# Patient Record
Sex: FEMALE | Race: WHITE
[De-identification: names, ages, dates, MRNs, and addresses within clinical notes are randomized per-mention and may not be internally consistent; named-entity substitution may affect disease eponyms.]

---

## 2020-08-11 ENCOUNTER — HOSPITAL ENCOUNTER (INPATIENT)
Dept: HOSPITAL 35 - ER | Age: 65
LOS: 2 days | Discharge: HOME | DRG: 246 | End: 2020-08-13
Attending: HOSPITALIST | Admitting: HOSPITALIST
Payer: COMMERCIAL

## 2020-08-11 VITALS — SYSTOLIC BLOOD PRESSURE: 154 MMHG | DIASTOLIC BLOOD PRESSURE: 95 MMHG

## 2020-08-11 VITALS — SYSTOLIC BLOOD PRESSURE: 105 MMHG | DIASTOLIC BLOOD PRESSURE: 58 MMHG

## 2020-08-11 VITALS — DIASTOLIC BLOOD PRESSURE: 66 MMHG | SYSTOLIC BLOOD PRESSURE: 112 MMHG

## 2020-08-11 VITALS — HEIGHT: 60 IN | BODY MASS INDEX: 30.23 KG/M2 | WEIGHT: 154 LBS

## 2020-08-11 VITALS — SYSTOLIC BLOOD PRESSURE: 141 MMHG | DIASTOLIC BLOOD PRESSURE: 81 MMHG

## 2020-08-11 VITALS — SYSTOLIC BLOOD PRESSURE: 113 MMHG | DIASTOLIC BLOOD PRESSURE: 66 MMHG

## 2020-08-11 VITALS — SYSTOLIC BLOOD PRESSURE: 145 MMHG | DIASTOLIC BLOOD PRESSURE: 83 MMHG

## 2020-08-11 VITALS — SYSTOLIC BLOOD PRESSURE: 109 MMHG | DIASTOLIC BLOOD PRESSURE: 76 MMHG

## 2020-08-11 VITALS — DIASTOLIC BLOOD PRESSURE: 62 MMHG | SYSTOLIC BLOOD PRESSURE: 105 MMHG

## 2020-08-11 VITALS — SYSTOLIC BLOOD PRESSURE: 127 MMHG | DIASTOLIC BLOOD PRESSURE: 70 MMHG

## 2020-08-11 VITALS — DIASTOLIC BLOOD PRESSURE: 74 MMHG | SYSTOLIC BLOOD PRESSURE: 127 MMHG

## 2020-08-11 VITALS — DIASTOLIC BLOOD PRESSURE: 76 MMHG | SYSTOLIC BLOOD PRESSURE: 109 MMHG

## 2020-08-11 VITALS — SYSTOLIC BLOOD PRESSURE: 116 MMHG | DIASTOLIC BLOOD PRESSURE: 95 MMHG

## 2020-08-11 VITALS — DIASTOLIC BLOOD PRESSURE: 69 MMHG | SYSTOLIC BLOOD PRESSURE: 119 MMHG

## 2020-08-11 DIAGNOSIS — K92.0: ICD-10-CM

## 2020-08-11 DIAGNOSIS — I21.3: Primary | ICD-10-CM

## 2020-08-11 DIAGNOSIS — Z71.6: ICD-10-CM

## 2020-08-11 DIAGNOSIS — F41.9: ICD-10-CM

## 2020-08-11 DIAGNOSIS — E53.8: ICD-10-CM

## 2020-08-11 DIAGNOSIS — E78.5: ICD-10-CM

## 2020-08-11 DIAGNOSIS — I25.119: ICD-10-CM

## 2020-08-11 DIAGNOSIS — Z91.19: ICD-10-CM

## 2020-08-11 DIAGNOSIS — Z79.899: ICD-10-CM

## 2020-08-11 DIAGNOSIS — F17.210: ICD-10-CM

## 2020-08-11 DIAGNOSIS — Z82.49: ICD-10-CM

## 2020-08-11 DIAGNOSIS — N17.0: ICD-10-CM

## 2020-08-11 DIAGNOSIS — Z95.2: ICD-10-CM

## 2020-08-11 DIAGNOSIS — I10: ICD-10-CM

## 2020-08-11 LAB
ANION GAP SERPL CALC-SCNC: 14 MMOL/L (ref 7–16)
BASOPHILS NFR BLD AUTO: 1.1 % (ref 0–2)
BUN SERPL-MCNC: 13 MG/DL (ref 7–18)
CALCIUM SERPL-MCNC: 9.1 MG/DL (ref 8.5–10.1)
CHLORIDE SERPL-SCNC: 101 MMOL/L (ref 98–107)
CHOLEST SERPL-MCNC: 252 MG/DL (ref ?–200)
CO2 SERPL-SCNC: 20 MMOL/L (ref 21–32)
CREAT SERPL-MCNC: 1.3 MG/DL (ref 0.6–1)
EOSINOPHIL NFR BLD: 0.3 % (ref 0–3)
ERYTHROCYTE [DISTWIDTH] IN BLOOD BY AUTOMATED COUNT: 13.5 % (ref 10.5–14.5)
ERYTHROCYTE [DISTWIDTH] IN BLOOD BY AUTOMATED COUNT: 13.5 % (ref 10.5–14.5)
FOLATE SERPL-MCNC: 13.1 NG/ML (ref 8.6–58.9)
GLUCOSE SERPL-MCNC: 113 MG/DL (ref 74–106)
GRANULOCYTES NFR BLD MANUAL: 72.6 % (ref 36–66)
HCT VFR BLD CALC: 41.8 % (ref 37–47)
HCT VFR BLD CALC: 43.1 % (ref 37–47)
HDLC SERPL-MCNC: 73 MG/DL (ref 40–?)
HGB BLD-MCNC: 13.9 GM/DL (ref 12–15)
HGB BLD-MCNC: 14.6 GM/DL (ref 12–15)
INR PPP: 1
LDLC SERPL-MCNC: 148 MG/DL (ref ?–100)
LYMPHOCYTES NFR BLD AUTO: 18.2 % (ref 24–44)
MCH RBC QN AUTO: 31 PG (ref 26–34)
MCH RBC QN AUTO: 31.1 PG (ref 26–34)
MCHC RBC AUTO-ENTMCNC: 33.2 G/DL (ref 28–37)
MCHC RBC AUTO-ENTMCNC: 33.9 G/DL (ref 28–37)
MCV RBC: 91.8 FL (ref 80–100)
MCV RBC: 93.2 FL (ref 80–100)
MONOCYTES NFR BLD: 7.8 % (ref 1–8)
NEUTROPHILS # BLD: 8.3 THOU/UL (ref 1.4–8.2)
PLATELET # BLD: 248 THOU/UL (ref 150–400)
PLATELET # BLD: 276 THOU/UL (ref 150–400)
POTASSIUM SERPL-SCNC: 4.1 MMOL/L (ref 3.5–5.1)
PROTHROMBIN TIME: 9.7 SECONDS (ref 9.3–11.4)
RBC # BLD AUTO: 4.48 MIL/UL (ref 4.2–5)
RBC # BLD AUTO: 4.7 MIL/UL (ref 4.2–5)
SODIUM SERPL-SCNC: 135 MMOL/L (ref 136–145)
TC:HDL: 3.5 RATIO
TRIGL SERPL-MCNC: 155 MG/DL (ref ?–150)
TROPONIN I SERPL-MCNC: 13.96 NG/ML (ref ?–0.06)
TSH SERPL-ACNC: 1.36 UIU/ML (ref 0.36–3.74)
VIT B12 SERPL-MCNC: 168 PG/ML (ref 193–986)
VLDLC SERPL CALC-MCNC: 31 MG/DL (ref ?–40)
WBC # BLD AUTO: 11.4 THOU/UL (ref 4–11)
WBC # BLD AUTO: 11.6 THOU/UL (ref 4–11)

## 2020-08-11 PROCEDURE — 27000 TENOTOMY ADDUCTOR HIP PERQ: CPT

## 2020-08-11 PROCEDURE — 10078: CPT

## 2020-08-11 PROCEDURE — 10081 I&D PILONIDAL CYST COMP: CPT

## 2020-08-11 NOTE — EKG
[facility]
[place]   [address]                     ELECTROCARDIOGRAM REPORT      
_______________________________________________________________________________
 
Name:       MIRELLA HUTCHINS                   Room #:         249-P       ADM IN  
M.R.#:      [id number]                       Account #:      [id number]  
Admission:  20    Attend Phys:    Adelso Andujar MD 
Discharge:              Date of Birth:  [birth year]  
                                                          Report #: [id number]
                                                                    [id number]
_______________________________________________________________________________
THIS REPORT FOR:  
 
cc:  Joes Johnston David J. DO Couchonnal, Luis F. MD                                            ~
THIS REPORT FOR:   //name//                          
 
                         [facility] ED
                                       
Test Date:    2020               Test Time:    11:40:01
Pat Name:     MIRELLA HUTCHINS              Department:   
Patient ID:   [id number]            Room:         249
Gender:       F                        Technician:   CARY
:          1955               Requested By: Jamir Cazares
Order Number: [id number] MD:   Shubham Garcia
                                 Measurements
Intervals                              Axis          
Rate:         80                       P:            -7
ND:           132                      QRS:          54
QRSD:         86                       T:            71
QT:           349                                    
QTc:          403                                    
                           Interpretive Statements
Sinus rhythm
Lateral infarct, acute (LAD)
Anteroseptal infarct, age indeterminate
No previous ECG available for comparison
Electronically Signed On 2020 15:41:36 CDT by Shubham Garcia
[email or web address]
 
 
 
 
 
 
 
 
 
 
 
 
 
 
 
  <ELECTRONICALLY SIGNED>
   By: Shubham Garcia MD        
  20     1541
D: 20 1140                           _____________________________________
T: 20 1140                           Shubham Garcia MD          /EPI

## 2020-08-11 NOTE — NUR
TPA TO BE GIVEN PER INTERVENTIONAL CARDIOLOGIST. RISKS AND BENEFITS DISCUSSED
WITH PATIENT BY DOCTOR. PATIENT DENIES ANY QUESTIONS.

## 2020-08-11 NOTE — EKG
[facility]
[place]   [address]                     ELECTROCARDIOGRAM REPORT      
_______________________________________________________________________________
 
Name:       MIRELLA HUTCHINS                   Room #:         249-P       ADM IN  
M.R.#:      [id number]                       Account #:      [id number]  
Admission:  20    Attend Phys:    Adelso Andujar MD 
Discharge:              Date of Birth:  [birth year]  
                                                          Report #: [id number]
                                                                    [id number]
_______________________________________________________________________________
THIS REPORT FOR:  
 
cc:  Jose Johnston David J. DO Couchonnal, Luis F. MD                                            ~
THIS REPORT FOR:   //name//                          
 
                         [facility] ED
                                       
Test Date:    2020               Test Time:    14:39:40
Pat Name:     MIRELLA HUTCHINS              Department:   
Patient ID:   [id number]            Room:         249
Gender:       F                        Technician:   ANTWON SAINZ
:          1955               Requested By: Jamir Cazares
Order Number: [id number] MD:   Shubham Garcia
                                 Measurements
Intervals                              Axis          
Rate:         73                       P:            8
UT:           127                      QRS:          77
QRSD:         102                      T:            62
QT:           397                                    
QTc:          438                                    
                           Interpretive Statements
Sinus rhythm
Lateral infarct, acute (LAD)
Anteroseptal infarct, age indeterminate
Compared to ECG 2020 11:40:01
No significant changes
Electronically Signed On 2020 15:42:16 CDT by Shubham Garcia
[email or web address]
 
 
 
 
 
 
 
 
 
 
 
 
 
 
  <ELECTRONICALLY SIGNED>
   By: Shubham Garcia MD        
  20     1542
D: 20 1439                           _____________________________________
T: 20 1439                           Shubham Garcia MD          /EPI

## 2020-08-11 NOTE — NUR
VAT CONSULTED FOR A PICC FOR MULITPLE MEDS IN ICU.  5FRTLPICC PLACED
RUABRACHIAL WITH PT HAVING EXTREME ANXIETY.  TIP AT THE [facility].  PLEASE SEE NI
FOR DETAILS

## 2020-08-11 NOTE — NUR
PT ARRIVED AT 1515 VIA STRETCHER FROM ER. PT WAS ABLE TO MOVE SELF FROM
STRETCHER TO BED. PT HOOKED UP TO CARDIAC MONITOR WITH BLOOD PRESSURE
MONITORING AND SPO2 MONITORING. PT STARTED ON IV NS PER ORDER. SEE MAR.
MEDICATIONS REQUESTED FROM PHARMACY AS ORDERED. PT BEGAN HAVING CHEST PAIN,
NITRO SUBLINGUAL GIVEN X2. PT STARTED ON 2L NC FOR SPO2 OF 88%. PT'S SON
ARRIVED AND IS IN ROOM WITH PT. PT STATES SHE WILL DECIDE IN AM IF SHE WANTS
TO HAVE CATH OR NOT. GI ROUNDED ON PT AND NEW ORDERS WRITTEN. CARDIOLOGY NP
CALLED ABOUT CHEST PAIN, NEW ORDERS WILL BE PLACED IN COMPUTER FOR NEW
MEDICATIONS PER CARDIOLOGY. PT'S SON GIVEN PRIVACY CODE AND VISITING HOURS
INFORMATION. RN VERIFIED WITH SON THAT WE HAVE CORRECT PHONE NUMBER AS HE IS
PT'S ONLY CONTACT. AWAITING NEW ORDERS AND WILL IMPLEMENT AS SOON AS THEY ARE
IN COMPUTER AND VERIFIED/AVAILABLE BY PHARMACY.

## 2020-08-12 VITALS — SYSTOLIC BLOOD PRESSURE: 129 MMHG | DIASTOLIC BLOOD PRESSURE: 78 MMHG

## 2020-08-12 VITALS — SYSTOLIC BLOOD PRESSURE: 139 MMHG | DIASTOLIC BLOOD PRESSURE: 78 MMHG

## 2020-08-12 VITALS — DIASTOLIC BLOOD PRESSURE: 83 MMHG | SYSTOLIC BLOOD PRESSURE: 132 MMHG

## 2020-08-12 VITALS — SYSTOLIC BLOOD PRESSURE: 135 MMHG | DIASTOLIC BLOOD PRESSURE: 89 MMHG

## 2020-08-12 VITALS — SYSTOLIC BLOOD PRESSURE: 146 MMHG | DIASTOLIC BLOOD PRESSURE: 97 MMHG

## 2020-08-12 VITALS — DIASTOLIC BLOOD PRESSURE: 97 MMHG | SYSTOLIC BLOOD PRESSURE: 146 MMHG

## 2020-08-12 VITALS — DIASTOLIC BLOOD PRESSURE: 77 MMHG | SYSTOLIC BLOOD PRESSURE: 135 MMHG

## 2020-08-12 VITALS — DIASTOLIC BLOOD PRESSURE: 74 MMHG | SYSTOLIC BLOOD PRESSURE: 133 MMHG

## 2020-08-12 VITALS — SYSTOLIC BLOOD PRESSURE: 132 MMHG | DIASTOLIC BLOOD PRESSURE: 78 MMHG

## 2020-08-12 VITALS — SYSTOLIC BLOOD PRESSURE: 118 MMHG | DIASTOLIC BLOOD PRESSURE: 60 MMHG

## 2020-08-12 VITALS — SYSTOLIC BLOOD PRESSURE: 121 MMHG | DIASTOLIC BLOOD PRESSURE: 64 MMHG

## 2020-08-12 VITALS — DIASTOLIC BLOOD PRESSURE: 77 MMHG | SYSTOLIC BLOOD PRESSURE: 126 MMHG

## 2020-08-12 VITALS — SYSTOLIC BLOOD PRESSURE: 134 MMHG | DIASTOLIC BLOOD PRESSURE: 80 MMHG

## 2020-08-12 VITALS — DIASTOLIC BLOOD PRESSURE: 89 MMHG | SYSTOLIC BLOOD PRESSURE: 131 MMHG

## 2020-08-12 VITALS — SYSTOLIC BLOOD PRESSURE: 126 MMHG | DIASTOLIC BLOOD PRESSURE: 77 MMHG

## 2020-08-12 VITALS — DIASTOLIC BLOOD PRESSURE: 82 MMHG | SYSTOLIC BLOOD PRESSURE: 138 MMHG

## 2020-08-12 VITALS — SYSTOLIC BLOOD PRESSURE: 120 MMHG | DIASTOLIC BLOOD PRESSURE: 67 MMHG

## 2020-08-12 VITALS — DIASTOLIC BLOOD PRESSURE: 73 MMHG | SYSTOLIC BLOOD PRESSURE: 129 MMHG

## 2020-08-12 VITALS — SYSTOLIC BLOOD PRESSURE: 148 MMHG | DIASTOLIC BLOOD PRESSURE: 96 MMHG

## 2020-08-12 VITALS — SYSTOLIC BLOOD PRESSURE: 123 MMHG | DIASTOLIC BLOOD PRESSURE: 78 MMHG

## 2020-08-12 VITALS — DIASTOLIC BLOOD PRESSURE: 77 MMHG | SYSTOLIC BLOOD PRESSURE: 130 MMHG

## 2020-08-12 VITALS — SYSTOLIC BLOOD PRESSURE: 132 MMHG | DIASTOLIC BLOOD PRESSURE: 74 MMHG

## 2020-08-12 VITALS — DIASTOLIC BLOOD PRESSURE: 77 MMHG | SYSTOLIC BLOOD PRESSURE: 128 MMHG

## 2020-08-12 VITALS — DIASTOLIC BLOOD PRESSURE: 85 MMHG | SYSTOLIC BLOOD PRESSURE: 139 MMHG

## 2020-08-12 VITALS — DIASTOLIC BLOOD PRESSURE: 78 MMHG | SYSTOLIC BLOOD PRESSURE: 137 MMHG

## 2020-08-12 VITALS — SYSTOLIC BLOOD PRESSURE: 120 MMHG | DIASTOLIC BLOOD PRESSURE: 65 MMHG

## 2020-08-12 VITALS — DIASTOLIC BLOOD PRESSURE: 74 MMHG | SYSTOLIC BLOOD PRESSURE: 134 MMHG

## 2020-08-12 LAB
ANION GAP SERPL CALC-SCNC: 11 MMOL/L (ref 7–16)
BASOPHILS NFR BLD AUTO: 0.4 % (ref 0–2)
BUN SERPL-MCNC: 8 MG/DL (ref 7–18)
CALCIUM SERPL-MCNC: 8.3 MG/DL (ref 8.5–10.1)
CHLORIDE SERPL-SCNC: 106 MMOL/L (ref 98–107)
CO2 SERPL-SCNC: 21 MMOL/L (ref 21–32)
CREAT SERPL-MCNC: 1 MG/DL (ref 0.6–1)
EOSINOPHIL NFR BLD: 0.1 % (ref 0–3)
ERYTHROCYTE [DISTWIDTH] IN BLOOD BY AUTOMATED COUNT: 13.6 % (ref 10.5–14.5)
GLUCOSE SERPL-MCNC: 131 MG/DL (ref 74–106)
GRANULOCYTES NFR BLD MANUAL: 75.4 % (ref 36–66)
HCT VFR BLD CALC: 37.4 % (ref 37–47)
HGB BLD-MCNC: 12.8 GM/DL (ref 12–15)
LYMPHOCYTES NFR BLD AUTO: 15 % (ref 24–44)
MAGNESIUM SERPL-MCNC: 1.8 MG/DL (ref 1.8–2.4)
MCH RBC QN AUTO: 32.1 PG (ref 26–34)
MCHC RBC AUTO-ENTMCNC: 34.2 G/DL (ref 28–37)
MCV RBC: 93.7 FL (ref 80–100)
MONOCYTES NFR BLD: 9.1 % (ref 1–8)
NEUTROPHILS # BLD: 7.1 THOU/UL (ref 1.4–8.2)
PLATELET # BLD: 220 THOU/UL (ref 150–400)
POTASSIUM SERPL-SCNC: 3.9 MMOL/L (ref 3.5–5.1)
RBC # BLD AUTO: 3.99 MIL/UL (ref 4.2–5)
SODIUM SERPL-SCNC: 138 MMOL/L (ref 136–145)
WBC # BLD AUTO: 9.4 THOU/UL (ref 4–11)

## 2020-08-12 NOTE — EKG
[facility]
[place]   [address]                     ELECTROCARDIOGRAM REPORT      
_______________________________________________________________________________
 
Name:       MIRELLA HUTCHINS                   Room #:         249-P       ADM IN  
M.R.#:      [id number]                       Account #:      [id number]  
Admission:  20    Attend Phys:    Adelso Andujar MD 
Discharge:              Date of Birth:  [birth year]  
                                                          Report #: [id number]
                                                                    [id number]
_______________________________________________________________________________
THIS REPORT FOR:  
 
cc:  Jose Johnston David J. DO Lundgren, Craig H. MD [facility]                                        ~
THIS REPORT FOR:   //name//                          
 
                          [facility]
                                       
Test Date:    2020               Test Time:    07:30:46
Pat Name:     MIRELLA HUTCHINS              Department:   
Patient ID:   [id number]            Room:         249 P
Gender:       F                        Technician:   ROMAN
:          1955               Requested By: Kellie Osborne
Order Number: [id number] MD:   Tulio Bronson
                                 Measurements
Intervals                              Axis          
Rate:         82                       P:            16
WI:           124                      QRS:          89
QRSD:         80                       T:            1
QT:           370                                    
QTc:          432                                    
                           Interpretive Statements
Sinus rhythm
Lateral infarct, acute (LAD)
Poor R wave progression
Compared to ECG 2020 14:39:40
Lateral ST segment elevation is less pronounced
Electronically Signed On 2020 7:56:27 CDT by Tulio Bronson
[email or web address]
 
 
 
 
 
 
 
 
 
 
 
 
 
 
  <ELECTRONICALLY SIGNED>
   By: Tulio Bronson MD, FACC   
  20     0756
D: 20                           _____________________________________
T: 20                           Tulio Bronson MD, FACC     /EPI

## 2020-08-12 NOTE — 2DMMODE
[facility]
[address]
[place]   [address]                   2 D/M-MODE ECHOCARDIOGRAM     
_______________________________________________________________________________
 
Name:       MIRELLA HUTCHINS                   Room #:         249-P       ADM IN  
M.R.#:      [id number]                       Account #:      [id number]  
Admission:  20    Attend Phys:    Adelso Andujar MD 
Discharge:              Date of Birth:  [birth year]  
                                                          Report #: [id number]
                                                                    [id number]
_______________________________________________________________________________
THIS REPORT FOR:  
 
cc:  Jose Johnston David J. DO Lundgren, Craig H. MD [facility]                                        
                                                                       ~
 
--------------- APPROVED REPORT --------------
 
 
Study performed:  2020 08:06:26
 
EXAM: Comprehensive 2D, Doppler, and color-flow 
Echocardiogram 
Patient Location: ICU    
Room #:  249     Status:  routine
 
      BSA:         1.67
HR: 81 bpm BP:          135/89 mmHg 
Rhythm: NSR     
 
Indications
STEMI. Hx: HTN, HLP, tobacco.
 
2D Dimensions
RVDd:  33.81 mm  
IVSd:  10.81 (7-11mm) LVOT Diam:  18.19 (18-24mm) 
LVDd:  44.94 mm  
PWd:  11.32 (7-11mm) 
LVDs:  26.47 (25-40mm) 
Aortic Root:  28.67 mm 
 
Volumes
Left Atrial Volume (Systole) 
Single Plane 4CH:  32.03 mL Single Plane 2CH:  33.73 mL
    LA ESV Index:  22.00 mL/m2
 
Aortic Valve
AoV Peak Ever.:  1.34 m/s 
AO Peak Gr.:  7.13 mmHg  LVOT Max P.31 mmHg
    LVOT Max V:  1.35 m/s
CRYSTAL Vmax: 2.63 cm2  
 
Mitral Valve
    E/A Ratio:  0.8
    MV Decel. Time:  175.05 ms
 
 
[facility]
[address]
[place]  [address]
Phone:  [phone] 2 D/M-MODE ECHOCARDIOGRAM     
_______________________________________________________________________________
 
Name:            MIRELLA HUTCHINS                   Room #:        249-P       [facility] [place]
[facility]#:           [id number]          Account #:     [id number]  
Admission:       20         Attend Phys:   Adelso Andujar,
Discharge:                  Date of Birth: [birth year]  
                         Report #:      [id number]
        [id number]
_______________________________________________________________________________
MV E Max Ever.:  0.87 m/s 
MV A Ever.:  1.04 m/s  
MV PHT:  50.77 ms  
IVRT:  86.51 ms   
 
Pulmonary Valve
PV Peak Ever.:  1.18 m/s PV Peak Gr.:  5.52 mmHg
 
Pulmonary Vein
P Vein S:    0.70 m/s 
P Vein D:   0.51 m/s 
P Vein S/D Ratio:  1.37 
 
Tricuspid Valve
TR Peak Ever.:  2.81 m/s  RAP Estimate:  10.00 mmHg
TR Peak Gr.:  32.00 mmHg 
    PA Pressure:  42.00 mmHg
 
Left Ventricle
The left ventricle is normal size. There is normal left ventricular 
wall thickness. Left ventricular systolic function is mildly 
decreased. LVEF is 45%. Distal anteroapical hypokinesis. Probable 
false tendon near the apex Mild diastolic dysfunction
 
Right Ventricle
The right ventricle is normal size. The right ventricular systolic 
function is normal.
 
Atria
The left atrium size is normal. The right atrium size is 
normal.
 
Aortic Valve
The aortic valve is normal in structure. No aortic regurgitation is 
present. There is no aortic valvular stenosis.
 
Mitral Valve
The mitral valve is normal in structure. Trace mitral regurgitation. 
No evidence of mitral valve stenosis.
 
Tricuspid Valve
The tricuspid valve is normal in structure. Trace tricuspid 
regurgitation. Estimated PAP is 40-45mmHg.
 
Pulmonic Valve
Pulmonic valve is not well visualized. There is no pulmonic valvular 
 
 
[facility]
[address] [place]  [address]
Phone:  [phone]                    2 D/M-MODE ECHOCARDIOGRAM     
_______________________________________________________________________________
 
Name:            MIRELLA HUTCHINS                   Room #:        249-P       ADM IN
.R.#:           [id number]          Account #:     [id number]  
Admission:       20         Attend Phys:   Adelso Andujar,
Discharge:                  Date of Birth: [birth year]  
                         Report #:      [id number]
        [id number]
_______________________________________________________________________________
regurgitation. 
 
Great Vessels
The aortic root is normal in size. Ascending aorta is not well 
visualized. IVC is dilated and collapses <50% with 
inspiration.
 
Pericardium
There is no pericardial effusion.
 
<Conclusion>
Left ventricular systolic function is mildly decreased.
LVEF is 45%.  Distal anteroapical hypokinesis.  Probable false tendon 
near the apex
Mild diastolic dysfunction
The aortic valve is normal in structure. No aortic regurgitation or 
stenosis
The mitral valve is normal in structure. Trace mitral 
regurgitation.
Trace tricuspid regurgitation. Estimated pulmonary artery pressure of 
40-45mmHg.
There is no pericardial effusion.
 
 
 
 
 
 
 
 
 
 
 
 
 
 
 
 
 
 
 
 
 
 
  <ELECTRONICALLY SIGNED>
   By: Tulio Bronson MD, [facility]   
  20
D: 20                           _____________________________________
T: 20                           Tulio Bronson MD, [facility]     /INF

## 2020-08-12 NOTE — NUR
PT WAS TRANSFERRED TO ROOM 213 VIA WHEELCHAIR. PT GROIN SITE ROSSANA PERALTA RN AT
BEDSIDE TO RECEIVE

## 2020-08-12 NOTE — NUR
chart review. unable to visit with zee sterling. cm visited with her son
rafael via phone call, intro to cm and transition of care ie hh or dme. " she
lives with me, rent home. 1 main floor, not even a basement. she is
independent. no medical equip, manage own medication and drives. she my only
family everyone else has "/rafael. active listening and support during
visit. will cont following as needed for dc needs.

## 2020-08-12 NOTE — NUR
ASSUMED CARE OF PT AT FROM ICU AT APPROX 1445. ASSESSMENTS AS CHARTED. MEDS
GIVEN PER MAR. PT A&OX4 BUT ANXIOUS AND AGITATED AS TIMES. R GROIN SITE
REMAINS SOFT, NO HEMATOMA OR BRUISING. WILL CONTINUE TO MONITOR AND FOLLOW
POC.

## 2020-08-12 NOTE — NUR
PT IS ALERT AND ORIENTED X4. REPORTS PAIN A 7 IN HER NECK AND BACK. AND SAYS
TO BE WE ARE DOING FUCK FOR HER SO WHY BOTHER. PAIN MEDS AND ATIVAN GIVEN TO
HELP HER WITH PAIN AND ANXIETY CLEARLY NOTED. PT APPEARS AGITATED WITH STAFF
IN ANY REGARDS TO ASSISTING HER TO THE BATHROOM OR PT CARE NEEDS. SHE
CONTINUES TO CUSS AND SWEAR AND BE VERBALLY ABUSIVE TOWARDS NURSING ASSSITANT
AND RN. CALL LIGHT WITHIN REACH IF NEEDS ASISSTANCE WITH CARE.

## 2020-08-12 NOTE — NUR
PT RETURNED FROM CATH LAB AT 1030. GROIN SITE WNL. PT VERY AGITATED, C/O
HEADACHE. PT BENDING LEGS AND TRYING TO SIT UP. EXPLAINED THAT PT NEEDED TO
LAY FLAT AND KEEP LEG STRAIGHT UNTIL BED REST IS COMPLETED. PT COMPLAINING
THAT SHE WANTS TO SIT UP, TEARFUL. SON AT BEDSIDE.

## 2020-08-12 NOTE — NUR
CHEST PAIN REMAINED STABLE OVER NOC. PT RATED PAIN AT 2/10. PT RESTED WELL FOR
MOST OF THE NOC, NO NEW CONCERNS STATED. PT IS STABLE ON NITRO HEPARIN AND
PROTONIX GTT. WILL CONTINUE TO CLOSELY MONITOR

## 2020-08-12 NOTE — NUR
pt very tearful this morning, states she is just a sad old lady, she says she
just wants to go home and that she will have this heart cath done so that she
can go home but she wants it done now, not at 4pm. told her that i would talk
to the np when she came around. she states she is scared as she had a friend's
 die recently from a heart cath. she states if she didn't know this she
wouldn't have had any issues having the cath done as her mom had had one
before. she states that she sees her friend posting on fb all the time about
how lonely she is now and it upsets her. asked her if she had someone that
could  come up to be with her. she said she only has two people in the area,
her son and her boyfriend, and that both of them have to work today. pt states
she is just hungry and wants some coffee.
np from cardiology here to see pt. she spoke with her and cath was agreed
upon. np said pt will be second today. troponin drawn, metoprolol given and
echo at bedside now.

## 2020-08-13 VITALS — SYSTOLIC BLOOD PRESSURE: 106 MMHG | DIASTOLIC BLOOD PRESSURE: 69 MMHG

## 2020-08-13 VITALS — DIASTOLIC BLOOD PRESSURE: 72 MMHG | SYSTOLIC BLOOD PRESSURE: 115 MMHG

## 2020-08-13 VITALS — SYSTOLIC BLOOD PRESSURE: 115 MMHG | DIASTOLIC BLOOD PRESSURE: 72 MMHG

## 2020-08-13 VITALS — SYSTOLIC BLOOD PRESSURE: 106 MMHG | DIASTOLIC BLOOD PRESSURE: 58 MMHG

## 2020-08-13 LAB
HCT VFR BLD CALC: 32.2 % (ref 37–47)
HGB BLD-MCNC: 11 GM/DL (ref 12–15)

## 2020-08-13 NOTE — NUR
PT WANTING TO LEAVE AMA. DRESSED IN HER CLOTHES. SAYS THE PILLOWS ARE HARD.
FOOD IS AWFULL AND I CAN GO HOME. I INFORMED HER NO NOT TONIGHT THAT IS NOT
MEDICALLY ADVISED AT THIS POST TIME SHE HAD A CATH PROCEDURE. SHES STATES I
DONT GIVEN A FUCK! IM CALLING THE POLICE ON YOU. NOTIFIED SANJEEV NP TO COME
TALK WITH PT DUE TO HER NOT MAKING RATIONALE DESCTIONS IN REGARDS TO HEALTH
CARE. INFORM HER SHE WOULD ASSUME THE BILL AND ANYTHING THAT COULD POSSIBLY
HAPPEN TO HER SINCE WE WONT BE DISCHARGING HER IN THIS CONDITION.

## 2020-08-13 NOTE — NUR
PT CARE ASSUMED AT 0700. ASSESSMENTS AS CHARTED. MEDICATION AS CHARTED. NO
HEMATEMESIS. NO BLOODY STOOLS. PT IS AGITATED AND IMPULSIVE. PT WANTS TO GO
HOME. PT LEFT DESPITE 'S REQUEST TO SPEAK WITH HER. PICC RT D/C'D. TELEMETRY
D/C'D. PT INSISTED ON GOING HOME.

## 2020-08-28 NOTE — CATHLAB
[facility]
[address]
[place]   [address]                   INVASIVE PROCEDURE REPORT     
_______________________________________________________________________________
 
Name:       MIRELLA HUTCHINS                   Room #:         213-P       [facility] IN  
M.R.#:      [id number]                       Account #:      [id number]  
Admission:  08/11/20    Attend Phys:    Adelso Andujar MD 
Discharge:  08/13/20    Date of Birth:  [birth year]  
                                                          Report #: [id number]
                                                                    [id number]
_______________________________________________________________________________
THIS REPORT FOR:  
 
cc:  Jose Johnston David J. DO Lammoglia, Francisco J. MD                                        
                                                                       ~
 
--------------- APPROVED REPORT --------------
 
 
Study performed:  08/12/2020 08:53:39
 
Patient Details
Patient Status: Out-Patient                  Room #: 
The patient is a 65 year-old female
 
Event Personnel
Juan Francisco Brownlee  Interventional Cardiologist, Lamar Cooper RN 
RN, Deborah Small RTR, CHON Scrub, Verona Chapman RTR Scrub, Rafaela Harris RTR Monitor, Milagros Pitts  Circulator
 
Procedures Performed
Art Access - R femoral artery*  Coronary Angiography Only [id number] 
CORANG 21147 Initial Mod Sed Same Phys/QHP Gr5y 738515 81801 Mod Sed 
Same Phys/QHP Ea [id number] Hemostasis w/ Mynx  ABENA Place w/wo Plasty 
Single DIAG 048696, supervision of conscious 
sedation
 
Indication
Non-STEMI , Chest pain
 
Procedure Narrative
The Right Groin^ was infiltrated with 1% Lidocaine subcutaneous 
anesthesia. A PINNACLE 6FR Sheath #[id number] sheath was inserted into 
the RFA 6 FR^. Coronary angiography was performed using coronary 
diagnostic catheters. The right coronary system was accessed and 
visualized with a 6FR JR 4 #[id number] catheter. The left coronary system 
was accessed and visualized with a LAUNCHER 6FR JL4 #[id number] catheter. 
Closure device was deployed with a 6 Fr MYNXGRIP 6/7F #[id number]. The 
patient tolerated the procedure well and there were no complications 
associated with the procedure. There was no hematoma.
 
Intraoperative Conscious Sedation
Sedation start time:  926           Case end Time:  
1010    
      Versed  4 mg  
 
 
[facility]
[address]
[place]  [address]
Phone:  [phone]                    INVASIVE PROCEDURE REPORT     
_______________________________________________________________________________
 
Name:            MIRELLA HUTCHINS                   Room #:        213-P       [facility] [place]
[facility].#:           [id number]          Account #:     [id number]  
Admission:       08/11/20         Attend Phys:   Adelso Andujar,
Discharge:    08/13/20      Date of Birth: [birth year]  
                         Report #:      [id number]
        [id number]
_______________________________________________________________________________
 
Fluoro Time:    8.41 minutes    
Dose:     DAP 4451.80 cGycm2  652 mGy  
Contrast Type and Amount:  Omnipaque 142 ml   
 
Coronary Angiography
The patient's coronary anatomy is right dominant. 
 
Diagnostic Cath
Left Main Arrhythmias normal origin caliber bifurcates left anterior 
descending left circumflex.  Is short in length and may be 
nonexistent with a co-ostium of the LAD and left circumflex no 
high-grade lesions are noted
LAD  Moderate caliber type II vessel which courses in the anterior 
interventricular sulcus giving rise to septal diagonal branches.  The 
LAD proper has mild to moderate irregularities noted.  First diagonal 
is totally occluded at its origin with WALTER 0 to WALTER I 
flow.
Diagonal 1 Small caliber vessel occluded proximally with minimal 
antegrade flow
Circumflex Moderate to large caliber vessel proceeds in the AV groove 
giving rise to a small first marginal branch and then continues with 
laterally and posteriorly impressive bifurcating marginal branch 
which is moderate in caliber.  Luminal irregularities are noted.  The 
circumflex and terminates a small posterior wall branch.
Right Coronary Moderate to large caliber vessel which in its proximal 
course is an eccentric lesion of approximately 50 to 60%.  Does not 
appear to be flow-limiting.  Then continues posteriorly the crux of 
the heart giving rise to a posterior descending artery
R PDA  Small caliber vessel without high-grade lesions
 
Left Ventriculography
Left Ventriculography was not performed.     
 
Hemodynamics
The aortic pressure is 132/75 mmHg with a mean of 74 mmHg. 
 
PCI Technique
Left coronary ostium was engaged with a standard guide.  Utilizing a 
0.014 wire that was advanced through the occluded diagonal branch and 
distally.  Subsequent to this a balloon was positioned across the 
total occlusion and dilated per standard protocol and noted in chart. 
 Antegrade flow was reestablished and the vessel appeared to be small 
in caliber but at least 2 mm in diameter.  A ABENA stent was then 
positioned across the region of total occlusion and deployed per 
standard protocol.  Post deployment antegrade flow was present no 
 
 
[facility]
[address] [place]  [address]
Phone:  [phone]                    INVASIVE PROCEDURE REPORT     
_______________________________________________________________________________
 
Name:            MIRELLA HUTCHINS                   Room #:        213-P       [facility] [place]
M.R.#:           [id number]          Account #:     [id number]  
Admission:       08/11/20         Attend Phys:   Adelso Andujar,
Discharge:    08/13/20      Date of Birth: [birth year]  
                         Report #:      [id number]
        [id number]
_______________________________________________________________________________
loss of side branches embolization were noted.  Patient tolerated 
procedure well there were no complications
 
PCI Technique Lesion
Percutaneous coronary intervention was performed on the first 
diagnonal branch segment. A LAUNCHER 6FR JL4 #[id number] Guide Catheter 
was used to engage the ostium. A Luge Wire (J) .014 X 182CM #[id number] 
Interventional Guidewire was used to cross the lesion.
 
BALLOON DILATION
A Balloon catheter Sprinter OTW 2.0 x 12 #[id number] was inserted and 
inflated up to 8.00atm for 21seconds. Additional Inflation: 8.00atm 
for 71seconds.
 
STENT DEPLOYMENT
A drug-eluting stent RESOLUTE TAY OTW 2.0 X 15 #[id number] was inserted 
and inflated up to 12.00atm for 22seconds. Additional Inflation: 
16.00atm for 15seconds.
 
Conclusion
1.  Coronary disease moderate two-vessel with high-grade totally 
occluded first diagonal branch of the LAD
2.  Abnormal hemodynamics elevated low ventricular end-diastolic 
pressures
3.  Successful revascularization and stenting of the first diagonal 
branch with a 2.0 mm ABENA stent as noted in the procedure log 
 
Recommendations
Cardiac Risk Reduction Program
1.  Dual antiplatelet therapy with aspirin and Prasugrel
 
 
 
 
 
 
 
 
 
 
 
 
 
 
  <ELECTRONICALLY SIGNED>
   By: Juan Francisco Brownlee MD    
  08/28/20     1309
D: 08/28/20 1309                           _____________________________________
T: 08/28/20 1309                           Juan Francisco Brownlee MD      /INF

## 2021-08-12 ENCOUNTER — HOSPITAL ENCOUNTER (OUTPATIENT)
Dept: HOSPITAL 35 - SJCVC | Age: 66
End: 2021-08-12
Attending: INTERNAL MEDICINE
Payer: COMMERCIAL

## 2021-08-12 DIAGNOSIS — Z79.899: ICD-10-CM

## 2021-08-12 DIAGNOSIS — E78.5: Primary | ICD-10-CM
